# Patient Record
Sex: FEMALE | Race: WHITE | Employment: FULL TIME | URBAN - METROPOLITAN AREA
[De-identification: names, ages, dates, MRNs, and addresses within clinical notes are randomized per-mention and may not be internally consistent; named-entity substitution may affect disease eponyms.]

---

## 2017-10-30 ENCOUNTER — LAB ENCOUNTER (OUTPATIENT)
Dept: LAB | Age: 32
End: 2017-10-30
Attending: OBSTETRICS & GYNECOLOGY
Payer: COMMERCIAL

## 2017-10-30 DIAGNOSIS — Z01.419 PAP SMEAR, LOW-RISK: ICD-10-CM

## 2017-10-30 DIAGNOSIS — Z00.00 EXAMINATION, MEDICAL, GENERAL: Primary | ICD-10-CM

## 2017-10-30 PROCEDURE — 87624 HPV HI-RISK TYP POOLED RSLT: CPT

## 2017-10-30 PROCEDURE — 88175 CYTOPATH C/V AUTO FLUID REDO: CPT

## 2019-11-15 PROBLEM — Z97.5 IUD (INTRAUTERINE DEVICE) IN PLACE: Status: ACTIVE | Noted: 2019-11-15

## 2020-12-08 PROBLEM — S82.839A AVULSION FRACTURE OF DISTAL FIBULA: Status: ACTIVE | Noted: 2020-12-08

## 2020-12-08 PROBLEM — S93.491A SPRAIN OF ANTERIOR TALOFIBULAR LIGAMENT OF RIGHT ANKLE, INITIAL ENCOUNTER: Status: ACTIVE | Noted: 2020-12-08

## 2025-03-08 ENCOUNTER — APPOINTMENT (OUTPATIENT)
Dept: CT IMAGING | Facility: HOSPITAL | Age: 40
End: 2025-03-08
Attending: EMERGENCY MEDICINE
Payer: COMMERCIAL

## 2025-03-08 ENCOUNTER — HOSPITAL ENCOUNTER (OUTPATIENT)
Facility: HOSPITAL | Age: 40
Setting detail: OBSERVATION
Discharge: HOME | End: 2025-03-09
Attending: EMERGENCY MEDICINE | Admitting: HOSPITALIST
Payer: COMMERCIAL

## 2025-03-08 DIAGNOSIS — N20.0 KIDNEY STONE: Primary | ICD-10-CM

## 2025-03-08 LAB
ALBUMIN SERPL-MCNC: 4.1 G/DL (ref 3.2–4.8)
ALBUMIN/GLOB SERPL: 1.4 {RATIO} (ref 1–2)
ALP LIVER SERPL-CCNC: 66 U/L
ALT SERPL-CCNC: 30 U/L
ANION GAP SERPL CALC-SCNC: 10 MMOL/L (ref 0–18)
AST SERPL-CCNC: 21 U/L (ref ?–34)
B-HCG UR QL: NEGATIVE
BASOPHILS # BLD AUTO: 0.05 X10(3) UL (ref 0–0.2)
BASOPHILS NFR BLD AUTO: 0.5 %
BILIRUB SERPL-MCNC: 0.4 MG/DL (ref 0.3–1.2)
BILIRUB UR QL STRIP.AUTO: NEGATIVE
BUN BLD-MCNC: 13 MG/DL (ref 9–23)
CALCIUM BLD-MCNC: 8.7 MG/DL (ref 8.7–10.6)
CHLORIDE SERPL-SCNC: 105 MMOL/L (ref 98–112)
CLARITY UR REFRACT.AUTO: CLEAR
CO2 SERPL-SCNC: 24 MMOL/L (ref 21–32)
COLOR UR AUTO: YELLOW
CREAT BLD-MCNC: 0.87 MG/DL
EGFRCR SERPLBLD CKD-EPI 2021: 87 ML/MIN/1.73M2 (ref 60–?)
EOSINOPHIL # BLD AUTO: 0.16 X10(3) UL (ref 0–0.7)
EOSINOPHIL NFR BLD AUTO: 1.7 %
ERYTHROCYTE [DISTWIDTH] IN BLOOD BY AUTOMATED COUNT: 12.5 %
GLOBULIN PLAS-MCNC: 2.9 G/DL (ref 2–3.5)
GLUCOSE BLD-MCNC: 135 MG/DL (ref 70–99)
GLUCOSE UR STRIP.AUTO-MCNC: NORMAL MG/DL
HCT VFR BLD AUTO: 39 %
HGB BLD-MCNC: 13.2 G/DL
IMM GRANULOCYTES # BLD AUTO: 0.03 X10(3) UL (ref 0–1)
IMM GRANULOCYTES NFR BLD: 0.3 %
KETONES UR STRIP.AUTO-MCNC: NEGATIVE MG/DL
LEUKOCYTE ESTERASE UR QL STRIP.AUTO: 250
LIPASE SERPL-CCNC: 27 U/L (ref 12–53)
LYMPHOCYTES # BLD AUTO: 1.42 X10(3) UL (ref 1–4)
LYMPHOCYTES NFR BLD AUTO: 15.4 %
MCH RBC QN AUTO: 29.2 PG (ref 26–34)
MCHC RBC AUTO-ENTMCNC: 33.8 G/DL (ref 31–37)
MCV RBC AUTO: 86.3 FL
MONOCYTES # BLD AUTO: 0.83 X10(3) UL (ref 0.1–1)
MONOCYTES NFR BLD AUTO: 9 %
NEUTROPHILS # BLD AUTO: 6.72 X10 (3) UL (ref 1.5–7.7)
NEUTROPHILS # BLD AUTO: 6.72 X10(3) UL (ref 1.5–7.7)
NEUTROPHILS NFR BLD AUTO: 73.1 %
NITRITE UR QL STRIP.AUTO: NEGATIVE
OSMOLALITY SERPL CALC.SUM OF ELEC: 290 MOSM/KG (ref 275–295)
PH UR STRIP.AUTO: 6.5 [PH] (ref 5–8)
PLATELET # BLD AUTO: 281 10(3)UL (ref 150–450)
POTASSIUM SERPL-SCNC: 4.1 MMOL/L (ref 3.5–5.1)
PROT SERPL-MCNC: 7 G/DL (ref 5.7–8.2)
PROT UR STRIP.AUTO-MCNC: 20 MG/DL
RBC # BLD AUTO: 4.52 X10(6)UL
RBC #/AREA URNS AUTO: >10 /HPF
SODIUM SERPL-SCNC: 139 MMOL/L (ref 136–145)
SP GR UR STRIP.AUTO: 1.03 (ref 1–1.03)
UROBILINOGEN UR STRIP.AUTO-MCNC: NORMAL MG/DL
WBC # BLD AUTO: 9.2 X10(3) UL (ref 4–11)

## 2025-03-08 PROCEDURE — 96361 HYDRATE IV INFUSION ADD-ON: CPT

## 2025-03-08 PROCEDURE — 87086 URINE CULTURE/COLONY COUNT: CPT | Performed by: EMERGENCY MEDICINE

## 2025-03-08 PROCEDURE — 99285 EMERGENCY DEPT VISIT HI MDM: CPT

## 2025-03-08 PROCEDURE — 74176 CT ABD & PELVIS W/O CONTRAST: CPT | Performed by: EMERGENCY MEDICINE

## 2025-03-08 PROCEDURE — 80053 COMPREHEN METABOLIC PANEL: CPT | Performed by: EMERGENCY MEDICINE

## 2025-03-08 PROCEDURE — 81001 URINALYSIS AUTO W/SCOPE: CPT | Performed by: EMERGENCY MEDICINE

## 2025-03-08 PROCEDURE — 83690 ASSAY OF LIPASE: CPT | Performed by: EMERGENCY MEDICINE

## 2025-03-08 PROCEDURE — 96375 TX/PRO/DX INJ NEW DRUG ADDON: CPT

## 2025-03-08 PROCEDURE — 85025 COMPLETE CBC W/AUTO DIFF WBC: CPT | Performed by: EMERGENCY MEDICINE

## 2025-03-08 PROCEDURE — 96365 THER/PROPH/DIAG IV INF INIT: CPT

## 2025-03-08 PROCEDURE — 81025 URINE PREGNANCY TEST: CPT

## 2025-03-08 RX ORDER — MORPHINE SULFATE 2 MG/ML
2 INJECTION, SOLUTION INTRAMUSCULAR; INTRAVENOUS EVERY 2 HOUR PRN
Status: DISCONTINUED | OUTPATIENT
Start: 2025-03-08 | End: 2025-03-09

## 2025-03-08 RX ORDER — MORPHINE SULFATE 2 MG/ML
1 INJECTION, SOLUTION INTRAMUSCULAR; INTRAVENOUS EVERY 2 HOUR PRN
Status: DISCONTINUED | OUTPATIENT
Start: 2025-03-08 | End: 2025-03-09

## 2025-03-08 RX ORDER — SODIUM CHLORIDE, SODIUM LACTATE, POTASSIUM CHLORIDE, CALCIUM CHLORIDE 600; 310; 30; 20 MG/100ML; MG/100ML; MG/100ML; MG/100ML
INJECTION, SOLUTION INTRAVENOUS CONTINUOUS
Status: DISCONTINUED | OUTPATIENT
Start: 2025-03-08 | End: 2025-03-09

## 2025-03-08 RX ORDER — ACETAMINOPHEN 500 MG
500 TABLET ORAL EVERY 4 HOURS PRN
Status: DISCONTINUED | OUTPATIENT
Start: 2025-03-08 | End: 2025-03-09

## 2025-03-08 RX ORDER — MORPHINE SULFATE 4 MG/ML
4 INJECTION, SOLUTION INTRAMUSCULAR; INTRAVENOUS EVERY 30 MIN PRN
Status: DISCONTINUED | OUTPATIENT
Start: 2025-03-08 | End: 2025-03-08 | Stop reason: DRUGHIGH

## 2025-03-08 RX ORDER — PROCHLORPERAZINE EDISYLATE 5 MG/ML
5 INJECTION INTRAMUSCULAR; INTRAVENOUS EVERY 8 HOURS PRN
Status: DISCONTINUED | OUTPATIENT
Start: 2025-03-08 | End: 2025-03-09

## 2025-03-08 RX ORDER — KETOROLAC TROMETHAMINE 15 MG/ML
15 INJECTION, SOLUTION INTRAMUSCULAR; INTRAVENOUS EVERY 6 HOURS PRN
Status: DISCONTINUED | OUTPATIENT
Start: 2025-03-08 | End: 2025-03-09

## 2025-03-08 RX ORDER — MORPHINE SULFATE 4 MG/ML
4 INJECTION, SOLUTION INTRAMUSCULAR; INTRAVENOUS EVERY 2 HOUR PRN
Status: DISCONTINUED | OUTPATIENT
Start: 2025-03-08 | End: 2025-03-09

## 2025-03-08 RX ORDER — ONDANSETRON 2 MG/ML
4 INJECTION INTRAMUSCULAR; INTRAVENOUS EVERY 6 HOURS PRN
Status: DISCONTINUED | OUTPATIENT
Start: 2025-03-08 | End: 2025-03-09

## 2025-03-08 RX ORDER — SODIUM CHLORIDE 9 MG/ML
INJECTION, SOLUTION INTRAVENOUS CONTINUOUS
Status: DISCONTINUED | OUTPATIENT
Start: 2025-03-08 | End: 2025-03-09

## 2025-03-08 RX ORDER — ONDANSETRON 2 MG/ML
4 INJECTION INTRAMUSCULAR; INTRAVENOUS ONCE
Status: COMPLETED | OUTPATIENT
Start: 2025-03-08 | End: 2025-03-08

## 2025-03-08 NOTE — SPIRITUAL CARE NOTE
Spiritual Care Visit Note    Patient Name: Lizeth Pisano Date of Spiritual Care Visit: 25   : 1985 Primary Dx: Kidney stone   Referred By: ED Rounds     Spiritual Care Taxonomy:    Intended Effects: Demonstrate caring and concern    Methods: Offer support    Interventions: Ask questions to bring forth feelings, Silent prayer    Visit Type/Summary:  Leslie appeared to be sleeping when  arrived at her room.  She will remain on  visit schedule.     Spiritual Care support can be requested via an Epic consult. For urgent/immediate needs, please contact the On Call  at: [facility]: ext [phone]    Monica Gamboa MDiv.  Staff

## 2025-03-08 NOTE — ED PROVIDER NOTES
Patient Seen in: [facility] Emergency Department      History     Chief Complaint   Patient presents with    Abdomen/Flank Pain    Back Pain     Stated Complaint: R sided stomach, hip and back pain    Subjective:   HPI      39-year-old female presenting with right-sided pain.  Patient's pain came on acutely waking her up from sleep right side of her abdomen as well as right flank associate with nausea and vomiting.  No diarrhea no urinary complaints no cough cold fevers chills history of trauma or any other complaints    Objective:     Past Medical History:    H/O LEEP    History of blood transfusion    D&C               Past Surgical History:   Procedure Laterality Date    D & c      BONE REMOVED FROM THE RT FOOT     Mirena, iud, 5 year  12/15/2021    Other surgical history      bone removed Right foot     Other surgical history  13    right thumb radial collateral ligament repair of the metacarpophalangeal joint on 13 at [facility]                Social History     Socioeconomic History    Marital status:    Tobacco Use    Smoking status: Former     Current packs/day: 0.00     Average packs/day: 0.5 packs/day for 6.0 years (3.0 ttl pk-yrs)     Types: Cigarettes     Start date: 2002     Quit date: 2008     Years since quittin.5    Smokeless tobacco: Never   Vaping Use    Vaping status: Never Used   Substance and Sexual Activity    Alcohol use: Yes     Comment: social    Drug use: No    Sexual activity: Yes     Partners: Male     Birth control/protection: I.U.D., Mirena                  Physical Exam     ED Triage Vitals   BP 25 0203 114/80   Pulse 25 0203 76   Resp 25 0203 18   Temp 25 0205 98 °F (36.7 °C)   Temp src --    SpO2 25 0203 100 %   O2 Device 25 0330 None (Room air)       Current Vitals:   Vital Signs  BP: 101/63  Pulse: 69  Resp: 13  Temp: 98 °F (36.7 °C)  MAP (mmHg): 75    Oxygen Therapy  SpO2: 97 %  O2 Device: None (Room  air)        Physical Exam  Awake alert patient appears uncomfortable HEENT exam is normal lungs are clear cardiovascular exam shows regular rhythm abdomen soft nontender extremities no COVID cyanosis or edema no rash back exam is normal skin is nondiaphoretic no focal neurologic deficits    ED Course     Labs Reviewed   COMP METABOLIC PANEL (14) - Abnormal; Notable for the following components:       Result Value    Glucose 135 (*)     All other components within normal limits   URINALYSIS WITH CULTURE REFLEX - Abnormal; Notable for the following components:    Blood Urine 3+ (*)     Protein Urine 20 (*)     Leukocyte Esterase Urine 250 (*)     WBC Urine 6-10 (*)     RBC Urine >10 (*)     Squamous Epi. Cells Few (*)     All other components within normal limits   LIPASE - Normal   CBC WITH DIFFERENTIAL WITH PLATELET   URINE CULTURE, ROUTINE            Differential diagnosis includes sepsis, kidney stone       MDM          Admission disposition: 3/8/2025  4:10 AM           Medical Decision Making  39-year-old female presenting with right-sided pain.  IV established cardiac monitor shows a sinus rhythm pulse ox shows no signs of hypoxia CBC shows normal white cell count metabolic panel stable renal function urinalysis shows a possible right tract infection early Rocephin ordered.  Independent interpretation by ED physician of CT scan shows a 1 to 2 mm distal ureteral stone patient be kept n.p.o. antibiotics ordered patient will be admitted with urology consultation pain has improved.    Problems Addressed:  Kidney stone: acute illness or injury    Amount and/or Complexity of Data Reviewed  Labs: ordered. Decision-making details documented in ED Course.  Radiology: ordered and independent interpretation performed. Decision-making details documented in ED Course.  ECG/medicine tests: ordered and independent interpretation performed. Decision-making details documented in ED Course.        Disposition and Plan      Clinical Impression:  1. Kidney stone         Disposition:  Admit  3/8/2025  4:10 am    Follow-up:  No follow-up provider specified.        Medications Prescribed:  Current Discharge Medication List              Supplementary Documentation:         Hospital Problems       Present on Admission  Date Reviewed: 3/8/2025            ICD-10-CM Noted POA    * (Principal) Kidney stone N20.0 3/8/2025 Unknown

## 2025-03-08 NOTE — H&P
[facility] Hospitalist H&P/Consult note       CC:   Chief Complaint   Patient presents with    Abdomen/Flank Pain    Back Pain        PCP: Tere Curiel DO    History of Present Illness: Patient is a 39 year old female with no sig pmhx here for flank pain  Started last night, woke her up from sleep, hadchills, fever, n/v. Pain was in R flank / lower abd. No pain like this before. No changes in bms. Has not had dytsuris ut repors urgency      PMH  Past Medical History:    H/O LEEP    History of blood transfusion    D&C         PSH  Past Surgical History:   Procedure Laterality Date    D & c      BONE REMOVED FROM THE RT FOOT     Mirena, iud, 5 year  12/15/2021    Other surgical history      bone removed Right foot     Other surgical history  13    right thumb radial collateral ligament repair of the metacarpophalangeal joint on 13 at [facility]        ALL:  Allergies[1]     Home Medications:  Medications Taking[2]      Soc Hx  Social History     Tobacco Use    Smoking status: Former     Current packs/day: 0.00     Average packs/day: 0.5 packs/day for 6.0 years (3.0 ttl pk-yrs)     Types: Cigarettes     Start date: 2002     Quit date: 2008     Years since quittin.5    Smokeless tobacco: Never   Substance Use Topics    Alcohol use: Yes     Comment: social        Fam Hx  Family History   Problem Relation Age of Onset    Cancer Maternal Grandmother     Diabetes Maternal Grandmother     Heart Disorder Maternal Grandmother     Heart Disorder Maternal Grandfather     Heart Disorder Paternal Grandmother     Heart Disorder Paternal Grandfather     Other (Other) Paternal Grandfather         arthritis       Review of Systems  Comprehensive ROS reviewed and negative except for what's stated above.         OBJECTIVE:  /66 (BP Location: Left arm)   Pulse 72   Temp 99.2 °F (37.3 °C) (Oral)   Resp 16   Ht 6' 1\" (1.854 m)   Wt 285 lb (129.3 kg)   SpO2 96%   BMI 37.60 kg/m²   General:  Alert, no distress,  appears stated age.   Head:  Normocephalic,     Eyes:  Sclera anicteric     Throat: dry   Neck: Supple,    Lungs:   Clear to auscultation bilaterally. Normal effort   Chest wall:  No tenderness or deformity.   Heart:  Regular rate and rhythm    Abdomen:   Soft, NT/ND    Extremities: Atraumatic    Skin: No visible rashes or lesions.    Neurologic: Normal strength, no focal deficit appreciated     Diagnostic Data:    CBC/Chem  Recent Labs   Lab 03/08/25  0205   WBC 9.2   HGB 13.2   MCV 86.3   .0       Recent Labs   Lab 03/08/25  0205      K 4.1      CO2 24.0   BUN 13   CREATSERUM 0.87   *   CA 8.7       Recent Labs   Lab 03/08/25  0205   ALT 30   AST 21   ALB 4.1       No results for input(s): \"TROP\" in the last 168 hours.       Radiology: No results found.     ASSESSMENT / PLAN:     Patient is a 39 year old female with no sig pmhx here for flank pain    Impression    -flank pain, ureteral stone, R UVJ 1 mm with hydronephrosis  -UTI  -obesity, bmi 37  -malpositioned IUD    Plan    -IVF, strain urine  -rocephine, Cx pending  - eval - if unable to pass plan for surgery    -outpt gyn eval    scds        Further recommendations pending patient's clinical course. Dee hospitalist to continue to follow patient while in house    Patient and/or patient's family given opportunity to ask questions and note understanding and agreeing with therapeutic plan as outlined    Riaz Price Hospitalist  [phone]  Answering Service: [phone]           [1]   Allergies  Allergen Reactions    Celebrex [Celecoxib]      Hives     [2]   Outpatient Medications Marked as Taking for the 3/8/25 encounter (Hospital Encounter)   Medication Sig Dispense Refill    MIRENA 20 MCG/24HR IU IUD as directed

## 2025-03-08 NOTE — PROGRESS NOTES
NURSING ADMISSION NOTE      Patient admitted via cart.  Oriented to room.  Safety precautions initiated.  Bed in low position.  Call light in reach.  Admission navigator completed at bedside. Urology consulted in ED. Denies pain at this time.

## 2025-03-08 NOTE — ED QUICK NOTES
Rounding Completed    Plan of Care reviewed. Waiting for transport.  Elimination needs assessed.  Provided updates.    Bed is locked and in lowest position. Call light within reach.

## 2025-03-08 NOTE — ED INITIAL ASSESSMENT (HPI)
Pt ambulated into the ED from home with c/o right-sided flank pain that started around midnight. Pt states she thinks she woke up with a fever.

## 2025-03-08 NOTE — PLAN OF CARE
A&Ox4, VSS on room air. Reporting mild pain, Tylenol given with adequate relief. Voiding freely, straining, no stone noted. Plan for NPO at midnight.

## 2025-03-08 NOTE — ED QUICK NOTES
Orders for admission, patient is aware of plan and ready to go upstairs. Any questions, please call ED RN Itzel at extension [phone].     Patient Covid vaccination status: Fully vaccinated     COVID Test Ordered in ED: None    COVID Suspicion at Admission: N/A    Running Infusions:    sodium chloride 125 mL/hr at 03/08/25 0401        Mental Status/LOC at time of transport: AO x 4    Other pertinent information:   CIWA score: N/A   NIH score:  N/A

## 2025-03-08 NOTE — CONSULTS
[facility]   part of [facility]    Report of Consultation    Lizeth Pisano Patient Status:  Observation    1985 MRN [id number]   Location [facility] 3SW-A Attending Riaz Garcia DO   Hosp Day # 0 PCP Tere Curiel DO     Date of Admission:  3/8/2025  Date of Consult:  3/8/2025  Reason for Consultation:   Right distal ureteral stone, possible UTI    History of Present Illness:   Patient is a 39 year old female who was admitted to the hospital for Kidney stone:    39 year old woman with no prior history of nephrolithiasis who presented to the ER overnight due to abdominal and flank pain, as well as nausea and vomiting. She believes she had a fever at home.  She denies bothersome LUTS.  Some frequency, but denied dysuria.  She is not prone to UTIs.    In the ER, her UA showed mild pyuria (6-10 WBC) and no bacteria. WBC 9.2k, no left shift. Creatinine was 0.87.     She was admitted for pain control, and due to a possible UTI.  She was given rocephin in the ER.     Past Medical History  Past Medical History:    H/O LEEP    History of blood transfusion    D&C      Past Surgical History  Past Surgical History:   Procedure Laterality Date    D & c      BONE REMOVED FROM THE RT FOOT     Mirena, iud, 5 year  12/15/2021    Other surgical history      bone removed Right foot     Other surgical history  13    right thumb radial collateral ligament repair of the metacarpophalangeal joint on 13 at [facility]     Family History  Family History   Problem Relation Age of Onset    Cancer Maternal Grandmother     Diabetes Maternal Grandmother     Heart Disorder Maternal Grandmother     Heart Disorder Maternal Grandfather     Heart Disorder Paternal Grandmother     Heart Disorder Paternal Grandfather     Other (Other) Paternal Grandfather         arthritis     Social History  Social History     Socioeconomic History    Marital status:    Tobacco Use    Smoking status: Former     Current  packs/day: 0.00     Average packs/day: 0.5 packs/day for 6.0 years (3.0 ttl pk-yrs)     Types: Cigarettes     Start date: 2002     Quit date: 2008     Years since quittin.5    Smokeless tobacco: Never   Vaping Use    Vaping status: Never Used   Substance and Sexual Activity    Alcohol use: Yes     Comment: social    Drug use: No    Sexual activity: Yes     Partners: Male     Birth control/protection: I.U.D., Mirena     Social Drivers of Health     Food Insecurity: No Food Insecurity (3/8/2025)    NCSS - Food Insecurity     Worried About Running Out of Food in the Last Year: No     Ran Out of Food in the Last Year: No   Transportation Needs: No Transportation Needs (3/8/2025)    NCSS - Transportation     Lack of Transportation: No   Housing Stability: Not At Risk (3/8/2025)    NCSS - Housing/Utilities     Has Housing: Yes     Worried About Losing Housing: No     Unable to Get Utilities: No      Current Medications:  Current Facility-Administered Medications   Medication Dose Route Frequency    sodium chloride 0.9% infusion   Intravenous Continuous    lactated ringers infusion   Intravenous Continuous    acetaminophen (Tylenol Extra Strength) tab 500 mg  500 mg Oral Q4H PRN    morphINE PF 2 MG/ML injection 1 mg  1 mg Intravenous Q2H PRN    Or    morphINE PF 2 MG/ML injection 2 mg  2 mg Intravenous Q2H PRN    Or    morphINE PF 4 MG/ML injection 4 mg  4 mg Intravenous Q2H PRN    melatonin tab 3 mg  3 mg Oral Nightly PRN    ondansetron (Zofran) 4 MG/2ML injection 4 mg  4 mg Intravenous Q6H PRN    prochlorperazine (Compazine) 10 MG/2ML injection 5 mg  5 mg Intravenous Q8H PRN    ketorolac (Toradol) 15 MG/ML injection 15 mg  15 mg Intravenous Q6H PRN    [START ON 3/9/2025] cefTRIAXone (Rocephin) 2 g in sodium chloride 0.9% 100 mL IVPB-ADDV  2 g Intravenous Q24H     Medications Prior to Admission   Medication Sig    MIRENA 20 MCG/24HR IU IUD as directed    Loratadine (CLARITIN OR) Take 1 tablet by mouth daily.      Allergies  Allergies[1]    Review of Systems:    Pertinent items are noted in HPI.    Physical Exam:   Blood pressure 110/66, pulse 72, temperature 99.2 °F (37.3 °C), temperature source Oral, resp. rate 16, height 6' 1\" (1.854 m), weight 285 lb (129.3 kg), SpO2 96%, not currently breastfeeding.    General appearance:  alert, appears stated age, and cooperative  Head: Normocephalic, without obvious abnormality, atraumatic  Eyes: EOMI  Pulmonary: Non labored respirations  Extremities: extremities normal, atraumatic, no cyanosis or edema  Neurologic: Grossly normal  Psychiatric: calm    Results:     Laboratory Data:  Lab Results   Component Value Date    WBC 9.2 03/08/2025    HGB 13.2 03/08/2025    HCT 39.0 03/08/2025    .0 03/08/2025    CREATSERUM 0.87 03/08/2025    BUN 13 03/08/2025     03/08/2025    K 4.1 03/08/2025     03/08/2025    CO2 24.0 03/08/2025     (H) 03/08/2025    CA 8.7 03/08/2025    ALB 4.1 03/08/2025    ALKPHO 66 03/08/2025    TP 7.0 03/08/2025    AST 21 03/08/2025    ALT 30 03/08/2025    TSH 1.510 04/23/2021    LIP 27 03/08/2025    ESRML 16 05/16/2013    CRP 0.67 05/16/2013    RPR Nonreactive 04/03/2016       Imaging:  CT ABDOMEN+PELVIS KIDNEYSTONE 2D RNDR(NO IV,NO ORAL)(CPT=74176)    Result Date: 3/8/2025  CONCLUSION:  1. There is a 1 mm right UVJ stone causing moderate right hydronephrosis. 2. Atypical orientation of the IUD is noted with the horizontal limbs appearing to be imbedded within the myometrium.  Gynecology consult is recommended. 3. Reversal of normal SMA and SMV orientation consistent with congenital malrotation.  There is no evidence of a volvulus. 4. Small fat containing periumbilical hernia.     LOCATION:  [facility]   Dictated by (CST): Eugene Rodrigues MD on 3/08/2025 at 6:56 AM     Finalized by (CST): Eugene Rodrigues MD on 3/08/2025 at 6:59 AM           Impression:   39 year old woman, admitted with a 2mm right distal ureteral calculus with hydronephrosis.   She was admitted for pain control and due to a potential UTI.    -We reviewed that her UA showed no bacteria and only mild pyuria. She is not having UTI-symptoms and is not prone to infections. She did have a reported fever at home, but has been afebrile since admission. She was started on antibiotics in the ER.   -Reviewed options for medical expulsive therapy, versus proceeding with surgical intervention with ureteroscopy and stone extraction.  Given smaller size of stone and distal location, there is a fairly high chance of spontaneous passage.  We mutually decided to observe today.  -Strain all urine.    -Continue abx, await culture results.  -Continue IVF  -Pain meds PRN and tamsulosin 0.4 mg nightly.   -She can eat today. NPO @ MN in case she were to require surgical intervention.     We will follow along.     Thank you for allowing me to participate in the care of your patient.    Eugene Varma MD  3/8/2025         [1]   Allergies  Allergen Reactions    Celebrex [Celecoxib]      Hives

## 2025-03-09 VITALS
BODY MASS INDEX: 38.6 KG/M2 | OXYGEN SATURATION: 98 % | HEIGHT: 72 IN | WEIGHT: 285 LBS | RESPIRATION RATE: 18 BRPM | HEART RATE: 85 BPM | TEMPERATURE: 99 F | DIASTOLIC BLOOD PRESSURE: 80 MMHG | SYSTOLIC BLOOD PRESSURE: 125 MMHG

## 2025-03-09 LAB
ANION GAP SERPL CALC-SCNC: 4 MMOL/L (ref 0–18)
BUN BLD-MCNC: 9 MG/DL (ref 9–23)
CALCIUM BLD-MCNC: 8.4 MG/DL (ref 8.7–10.6)
CHLORIDE SERPL-SCNC: 107 MMOL/L (ref 98–112)
CO2 SERPL-SCNC: 29 MMOL/L (ref 21–32)
CREAT BLD-MCNC: 0.8 MG/DL
EGFRCR SERPLBLD CKD-EPI 2021: 96 ML/MIN/1.73M2 (ref 60–?)
GLUCOSE BLD-MCNC: 117 MG/DL (ref 70–99)
MAGNESIUM SERPL-MCNC: 1.8 MG/DL (ref 1.6–2.6)
OSMOLALITY SERPL CALC.SUM OF ELEC: 290 MOSM/KG (ref 275–295)
POTASSIUM SERPL-SCNC: 4.2 MMOL/L (ref 3.5–5.1)
SODIUM SERPL-SCNC: 140 MMOL/L (ref 136–145)

## 2025-03-09 PROCEDURE — 96366 THER/PROPH/DIAG IV INF ADDON: CPT

## 2025-03-09 PROCEDURE — 94760 N-INVAS EAR/PLS OXIMETRY 1: CPT

## 2025-03-09 PROCEDURE — 82365 CALCULUS SPECTROSCOPY: CPT | Performed by: UROLOGY

## 2025-03-09 PROCEDURE — 80048 BASIC METABOLIC PNL TOTAL CA: CPT | Performed by: INTERNAL MEDICINE

## 2025-03-09 PROCEDURE — 83735 ASSAY OF MAGNESIUM: CPT | Performed by: INTERNAL MEDICINE

## 2025-03-09 NOTE — DISCHARGE SUMMARY
[facility] Hospitalist Discharge Summary    Patient ID  Lizeth Pisano  [id number]  39 year old  7/23/1985    Admit date: 3/8/2025    Discharge date: 03/09/25    Attending: Riaz Garcia DO     Primary Care Physician: Tere Curiel DO      Reason for admission: ureteral stone    Discharge condition: stable    Disposition: home    Important follow up:  -PCP within 7 d  -specialists:      -labs:    -radiology:      Additional patient instructions       Discharge med list     Medication List        ASK your doctor about these medications      CLARITIN OR     Mirena (52 MG) 20 MCG/24HR Iud  Generic drug: Levonorgestrel              Discharge Diagnoses:  -flank pain, ureteral stone, R UVJ 1 mm with hydronephrosis  -UTI  -obesity, bmi 37  -malpositioned IUD    Consults:  IP CONSULT TO HOSPITALIST  IP CONSULT TO UROLOGY    Radiology:  CT ABDOMEN+PELVIS KIDNEYSTONE 2D RNDR(NO IV,NO ORAL)(CPT=74176)    Result Date: 3/8/2025  PROCEDURE:  CT ABDOMEN+PELVIS KIDNEYSTONE 2D RNDR(NO IV,NO ORAL)(CPT=74176)  COMPARISON:  None.  INDICATIONS:  R sided stomach, hip and back pain  TECHNIQUE:  Unenhanced multislice CT scanning from above the kidneys to below the urinary bladder.  2D rendering are generated on the CT scanner workstation to localize potential stones in the cranio-caudal plane.  Dose reduction techniques were used. Dose information is transmitted to the ACR (American College of Radiology) NRDR (National Radiology Data Registry) which includes the Dose Index Registry.  PATIENT STATED HISTORY: (As transcribed by Technologist)  Right sided pain    FINDINGS:  KIDNEYS:  There is a 1 mm calcification at the right ureteral vesicle junction with associated moderate right hydronephrosis. BLADDER:  No mass, calculus or significant wall thickening. ADRENALS:  No mass or enlargement.  LIVER:  No enlargement, atrophy, abnormal density, or significant focal lesion.  BILIARY:  No visible dilatation or calcification.  PANCREAS:  No  lesion, fluid collection, ductal dilatation, or atrophy.  SPLEEN:  No enlargement or focal lesion.  AORTA/VASCULAR:  No aneurysm.  RETROPERITONEUM:  No mass or adenopathy.  BOWEL/MESENTERY:  There is reversal of the normal orientation of SMA and SMV which is consistent with congenital malrotation.  There is no evidence of a volvulus. ABDOMINAL WALL:  There is a fat containing periumbilical hernia. BONES:  No bony lesion or fracture. PELVIC ORGANS:  Intrauterine contraceptive device has atypical orientation.  Horizontal limbs appear to be imbedded in the anterior and posterior myometrium. LUNG BASES:  No visible pulmonary or pleural disease.  OTHER:  Negative.             CONCLUSION:  1. There is a 1 mm right UVJ stone causing moderate right hydronephrosis. 2. Atypical orientation of the IUD is noted with the horizontal limbs appearing to be imbedded within the myometrium.  Gynecology consult is recommended. 3. Reversal of normal SMA and SMV orientation consistent with congenital malrotation.  There is no evidence of a volvulus. 4. Small fat containing periumbilical hernia.     LOCATION:  [facility]   Dictated by (CST): Eugene Rodrigues MD on 3/08/2025 at 6:56 AM     Finalized by (CST): Eugene Rodrigues MD on 3/08/2025 at 6:59 AM         Operative reports:  Procedure(s) (LRB):  CYSTOSCOPY, RETROGRADE,  URETEROSCOPY, STONE MANIPULATION WITH HOLIUM LASER AND INSERTION URETERAL STENT- RIGHT (N/A)    Hospital course:     Patient is a 39 year old female with no sig pmhx here for flank pain     Found to have 1mm ureteral R UVJ stone with hydronephrosis  Was monitored overnight, had no recurrence of pain. Likely stone had passed  Seen by     CT also showed malpositioned IUD, pt asked to fu with gyn as outpt    Day of discharge exam:  Vitals:    03/09/25 0730   BP: 125/80   Pulse: 85   Resp: 18   Temp: 99.2 °F (37.3 °C)     No nv  Tolerated po yesterday  No fevers, no pain    No acute distress, alert and oriented   Lungs  clear  Heart regular  Abdomen benign    Total time coordinating care 35 min      Patient and/or family had opportunity to ask questions and expressed understanding and agreement with therapeutic plan as outlined         Riaz Price Hospitalist  [phone]  Answering Service: [phone]

## 2025-03-09 NOTE — PLAN OF CARE
Patient A&O X4 on RA. VSS, /IS. SCDs declined. Voiding without difficulty via bathroom- straining all urine, LBM 03/08. Admitted with kidney stone. IVF infusing per orders. On IV Rocephin Q24h. Up ad antonella. Reminded to use call light. Plan for patient to be NPO at midnight for possible surgery tomorrow.

## 2025-03-09 NOTE — PLAN OF CARE
Patient A&Ox4, VSS on room air. Denies pain. Patent passed stone this morning, sent specimen to lab per orders. Plan to DC home today.

## 2025-03-09 NOTE — PROGRESS NOTES
[facility]   part of [facility]    Progress Note    Lizeth Pisano Patient Status:  Observation    1985 MRN [id number]   Location [facility] 3SW-A Attending Riaz Garcia DO   Hosp Day # 0 PCP Tere Curiel DO     Subjective:   Lizeth Pisano is a(n) 39 year old female admitted with an obstructing 2mm right distal ureteral calculus and a possible UTI. Tmax 99.4.     Creatinine 0.8 this AM.  Urine culture shows <10k mixed heidi - contaminant.    She passed her stone this AM.  It was saved in the strainer. It will be sent to the lab for analysis.     Objective:   Blood pressure 121/75, pulse 77, temperature 98.4 °F (36.9 °C), temperature source Oral, resp. rate 18, height 6' 1\" (1.854 m), weight 285 lb (129.3 kg), SpO2 95%, not currently breastfeeding.    General appearance:  alert, appears stated age, and cooperative  Head: Normocephalic, without obvious abnormality, atraumatic  Cardiovascular: regular rate and rhythm  Abdominal: soft, non-tender; bowel sounds normal; no masses,  no organomegaly  Neurologic: Grossly normal  Psychiatric: calm    Results:   Lab Results   Component Value Date    WBC 9.2 2025    HGB 13.2 2025    HCT 39.0 2025    .0 2025    CREATSERUM 0.80 2025    BUN 9 2025     2025    K 4.2 2025     2025    CO2 29.0 2025     (H) 2025    CA 8.4 (L) 2025    ALB 4.1 2025    ALKPHO 66 2025    BILT 0.4 2025    TP 7.0 2025    AST 21 2025    ALT 30 2025    TSH 1.510 2021    LIP 27 2025    ESRML 16 2013    CRP 0.67 2013    MG 1.8 2025    RPR Nonreactive 2016       CT ABDOMEN+PELVIS KIDNEYSTONE 2D RNDR(NO IV,NO ORAL)(CPT=74176)    Result Date: 3/8/2025  CONCLUSION:  1. There is a 1 mm right UVJ stone causing moderate right hydronephrosis. 2. Atypical orientation of the IUD is noted with the horizontal limbs  appearing to be imbedded within the myometrium.  Gynecology consult is recommended. 3. Reversal of normal SMA and SMV orientation consistent with congenital malrotation.  There is no evidence of a volvulus. 4. Small fat containing periumbilical hernia.     LOCATION:  [facility]   Dictated by (CST): Eugene Rodrigues MD on 3/08/2025 at 6:56 AM     Finalized by (CST): Eugene Rodrigues MD on 3/08/2025 at 6:59 AM            Assessment & Plan:     Kidney stone    39 year old woman, admitted with a right UVJ calculus with hydronephrosis and a possible UTI.     -Urine culture showed <10k mixed heidi, likely contaminated specimen.  -Patient passed her stone this AM. It was saved and will be sent for chemical analysis.  -We discussed stone prevention dietary changes, literature was provided.  -She can follow up with me in 1-2 months to discuss stone prevention in the office.  -She can be discharged to home today.  Urology will sign off.     Eugene Varma MD  3/9/2025

## 2025-03-09 NOTE — PROGRESS NOTES
AVS reviewed with patient. Reviewed instructions, medications and follow up appointments. All questions answered, verbalized understanding.

## 2025-03-18 LAB
CAOX DIHYDRATE: 40 %
CAOX MONOHYDRATE: 60 %
WEIGHT-STONE: 2 MG

## (undated) NOTE — LETTER
[address]  [address]  Authorization for Surgical Operation and Procedure     Date:___________                                                                                                         Time:__________  I hereby authorize Surgeon(s):  Eugene Varma MD, my physician and his/her assistants (if applicable), which may include medical students, residents, and/or fellows, to perform the following surgical operation/ procedure and administer such anesthesia as may be determined necessary by my physician:  Operation/Procedure name (s) Procedure(s):  CYSTOSCOPY, RETROGRADE,  URETEROSCOPY, STONE MANIPULATION WITH HOLIUM LASER AND INSERTION URETERAL STENT- RIGHT on [facility]   2.   I recognize that during the surgical operation/procedure, unforeseen conditions may necessitate additional or different procedures than those listed above.  I, therefore, further authorize and request that the above-named surgeon, assistants, or designees perform such procedures as are, in their judgment, necessary and desirable.    3.   My surgeon/physician has discussed prior to my surgery the potential benefits, risks and side effects of this procedure; the likelihood of achieving goals; and potential problems that might occur during recuperation.  They also discussed reasonable alternatives to the procedure, including risks, benefits, and side effects related to the alternatives and risks related to not receiving this procedure.  I have had all my questions answered and I acknowledge that no guarantee has been made as to the result that may be obtained.    4.   Should the need arise during my operation/procedure, which includes change of level of care prior to discharge, I also consent to the administration of blood and/or blood products.  Further, I understand that despite careful testing and screening of blood or blood products by collecting agencies, I may still be subject to ill  effects as a result of receiving a blood transfusion and/or blood products.  The following are some, but not all, of the potential risks that can occur: fever and allergic reactions, hemolytic reactions, transmission of diseases such as Hepatitis, AIDS and Cytomegalovirus (CMV) and fluid overload.  In the event that I wish to have an autologous transfusion of my own blood, or a directed donor transfusion, I will discuss this with my physician.  Check only if Refusing Blood or Blood Products  I understand refusal of blood or blood products as deemed necessary by my physician may have serious consequences to my condition to include possible death. I hereby assume responsibility for my refusal and release the hospital, its personnel, and my physicians from any responsibility for the consequences of my refusal.          o  Refuse      5.   I authorize the use of any specimen, organs, tissues, body parts or foreign objects that may be removed from my body during the operation/procedure for diagnosis, research or teaching purposes and their subsequent disposal by hospital authorities.  I also authorize the release of specimen test results and/or written reports to my treating physician on the hospital medical staff or other referring or consulting physicians involved in my care, at the discretion of the Pathologist or my treating physician.    6.   I consent to the photographing or videotaping of the operations or procedures to be performed, including appropriate portions of my body for medical, scientific, or educational purposes, provided my identity is not revealed by the pictures or by descriptive texts accompanying them.  If the procedure has been photographed/videotaped, the surgeon will obtain the original picture, image, videotape or CD.  The hospital will not be responsible for storage, release or maintenance of the picture, image, tape or CD.    7.   I consent to the presence of a  or observers  in the operating room as deemed necessary by my physician or their designees.    8.   I recognize that in the event my procedure results in extended X-Ray/fluoroscopy time, I may develop a skin reaction.    9. If I have a Do Not Attempt Resuscitation (DNAR) order in place, that status will be suspended while in the operating room, procedural suite, and during the recovery period unless otherwise explicitly stated by me (or a person authorized to consent on my behalf). The surgeon or my attending physician will determine when the applicable recovery period ends for purposes of reinstating the DNAR order.  10. Patients having a sterilization procedure: I understand that if the procedure is successful the results will be permanent and it will therefore be impossible for me to inseminate, conceive, or bear children.  I also understand that the procedure is intended to result in sterility, although the result has not been guaranteed.   11. I acknowledge that my physician has explained sedation/analgesia administration to me including the risk and benefits I consent to the administration of sedation/analgesia as may be necessary or desirable in the judgment of my physician.    I CERTIFY THAT I HAVE READ AND FULLY UNDERSTAND THE ABOVE CONSENT TO OPERATION and/or OTHER PROCEDURE.    _________________________________________  __________________________________  Signature of Patient     Signature of Responsible Person         ___________________________________         Printed Name of Responsible Person           _________________________________                 Relationship to Patient  _________________________________________  ______________________________  Signature of Witness          Date  Time      Patient Name: Lizeth JENNIFER Pisano     : 1985                 Printed: 2025     Medical Record #: [id number]                     Page 1 of 2                                    [address]  [place]  [address]    Consent for Anesthesia    Lizeth OVIEDO [facility] agree to be cared for by an anesthesiologist, who is specially trained to monitor me and give me medicine to put me to sleep or keep me comfortable during my procedure    I understand that my anesthesiologist is not an employee or agent of [facility] or [employer]. He or she works for [employer].    As the patient asking for anesthesia services, I agree to:  Allow the anesthesiologist (anesthesia doctor) to give me medicine and do additional procedures as necessary. Some examples are: Starting or using an “IV” to give me medicine, fluids or blood during my procedure, and having a breathing tube placed to help me breathe when I’m asleep (intubation). In the event that my heart stops working properly, I understand that my anesthesiologist will make every effort to sustain my life, unless otherwise directed by [facility] Do Not Resuscitate documents.  Tell my anesthesia doctor before my procedure:  If I am pregnant.  The last time that I ate or drank.  All of the medicines I take (including prescriptions, herbal supplements, and pills I can buy without a prescription (including street drugs/illegal medications). Failure to inform my anesthesiologist about these medicines may increase my risk of anesthetic complications.  If I am allergic to anything or have had a reaction to anesthesia before.  I understand how the anesthesia medicine will help me (benefits).  I understand that with any type of anesthesia medicine there are risks:  The most common risks are: nausea, vomiting, sore throat, muscle soreness, damage to my eyes, mouth, or teeth (from breathing tube placement).  Rare risks include: remembering what happened during my procedure, allergic reactions to medications, injury to my airway, heart, lungs, vision, nerves, or muscles and in extremely rare instances death.  My doctor has explained to me  other choices available to me for my care (alternatives).  Pregnant Patients (“epidural”):  I understand that the risks of having an epidural (medicine given into my back to help control pain during labor), include itching, low blood pressure, difficulty urinating, headache or slowing of the baby’s heart. Very rare risks include infection, bleeding, seizure, irregular heart rhythms and nerve injury.  Regional Anesthesia (“spinal”, “epidural”, & “nerve blocks”):  I understand that rare but potential complications include headache, bleeding, infection, seizure, irregular heart rhythms, and nerve injury.    I can change my mind about having anesthesia services at any time before I get the medicine.    _____________________________________________________________________________  Patient (or Representative) Signature/Relationship to Patient  Date   Time    _____________________________________________________________________________   Name (if used)    Language/Organization   Time    _____________________________________________________________________________  Anesthesiologist Signature     Date   Time  I have discussed the procedure and information above with the patient (or patient’s representative) and answered their questions. The patient or their representative has agreed to have anesthesia services.    _____________________________________________________________________________  Witness        Date   Time  I have verified that the signature is that of the patient or patient’s representative, and that it was signed before the procedure  Patient Name: Lizeth Pisano     : 1985                 Printed: 2025     Medical Record #: [id number]                     Page 2 of 2